# Patient Record
Sex: FEMALE | URBAN - METROPOLITAN AREA
[De-identification: names, ages, dates, MRNs, and addresses within clinical notes are randomized per-mention and may not be internally consistent; named-entity substitution may affect disease eponyms.]

---

## 2018-11-02 ENCOUNTER — OFFICE VISIT (OUTPATIENT)
Dept: URBAN - METROPOLITAN AREA CLINIC 11 | Facility: CLINIC | Age: 58
End: 2018-11-02
Payer: COMMERCIAL

## 2018-11-02 DIAGNOSIS — H43.813 VITREOUS DEGENERATION, BILATERAL: ICD-10-CM

## 2018-11-02 PROCEDURE — 92014 COMPRE OPH EXAM EST PT 1/>: CPT | Performed by: OPTOMETRIST

## 2018-11-02 ASSESSMENT — INTRAOCULAR PRESSURE
OS: 15
OD: 15

## 2018-11-02 NOTE — IMPRESSION/PLAN
Impression: Other headache syndrome: G44.89. Plan: Ocular health appears normal ou. Does not appear to be vision related. May need a neuro referral. cont care with PCP. Monitor.

## 2018-11-02 NOTE — IMPRESSION/PLAN
Impression: Type 2 diabetes mellitus w/o complication: U94.3. Plan: No signs of retinopathy or neovascularization noted. Discussed ocular and systemic benefits of blood sugar control.  RTC 1yr complete exam

## 2020-01-30 ENCOUNTER — OFFICE VISIT (OUTPATIENT)
Dept: URBAN - METROPOLITAN AREA CLINIC 11 | Facility: CLINIC | Age: 60
End: 2020-01-30
Payer: COMMERCIAL

## 2020-01-30 PROCEDURE — 92014 COMPRE OPH EXAM EST PT 1/>: CPT | Performed by: OPTOMETRIST

## 2020-01-30 RX ORDER — CARBOXYMETHYLCELLULOSE SODIUM 5 MG/ML
0.5 % SOLUTION/ DROPS OPHTHALMIC
Qty: 1 | Refills: 3 | Status: ACTIVE
Start: 2020-01-30

## 2020-01-30 RX ORDER — CYCLOSPORINE 0.5 MG/ML
0.05 % EMULSION OPHTHALMIC
Qty: 1 | Refills: 11 | Status: INACTIVE
Start: 2020-01-30 | End: 2020-03-05

## 2020-01-30 ASSESSMENT — KERATOMETRY
OD: 45.00
OS: 45.13

## 2020-01-30 ASSESSMENT — INTRAOCULAR PRESSURE
OD: 14
OS: 14

## 2020-01-30 NOTE — IMPRESSION/PLAN
Impression: Type 2 diabetes mellitus w/o complication: Q19.2. Plan: No signs of retinopathy or neovascularization noted. Discussed ocular and systemic benefits of blood sugar control.  RTC 1yr complete exam

## 2020-01-30 NOTE — IMPRESSION/PLAN
Impression: Dry eye syndrome of bilateral lacrimal glands: H04.123.  Plan: RX Refresh  1 gtt qid  and Restasis 1 gtt BID OU

## 2020-02-27 ENCOUNTER — OFFICE VISIT (OUTPATIENT)
Dept: URBAN - METROPOLITAN AREA CLINIC 11 | Facility: CLINIC | Age: 60
End: 2020-02-27
Payer: COMMERCIAL

## 2020-02-27 PROCEDURE — 99213 OFFICE O/P EST LOW 20 MIN: CPT | Performed by: OPTOMETRIST

## 2020-02-27 ASSESSMENT — INTRAOCULAR PRESSURE
OD: 14
OS: 15

## 2020-02-27 NOTE — IMPRESSION/PLAN
Impression: Dry eye syndrome of bilateral lacrimal glands: H04.123. Plan: Pt still symptomatic with Refresh. Continue Refresh 1gtt qid and rx Restasis 1 gtt BID OU. Sample Lotemax bid ou.

## 2020-07-22 ENCOUNTER — OFFICE VISIT (OUTPATIENT)
Dept: URBAN - METROPOLITAN AREA CLINIC 11 | Facility: CLINIC | Age: 60
End: 2020-07-22
Payer: COMMERCIAL

## 2020-07-22 PROCEDURE — 99213 OFFICE O/P EST LOW 20 MIN: CPT | Performed by: OPTOMETRIST

## 2020-07-22 RX ORDER — LIFITEGRAST 50 MG/ML
5 % SOLUTION/ DROPS OPHTHALMIC
Qty: 60 | Refills: 11 | Status: INACTIVE
Start: 2020-07-22 | End: 2021-04-14

## 2020-07-22 NOTE — IMPRESSION/PLAN
Impression: Dry eye syndrome of bilateral lacrimal glands: H04.123. Plan: D/C Restasis. Start Rx Xiidra BID OU. Continue Refresh 1gtt qid.  f/u 1 month

## 2020-09-09 ENCOUNTER — OFFICE VISIT (OUTPATIENT)
Dept: URBAN - METROPOLITAN AREA CLINIC 11 | Facility: CLINIC | Age: 60
End: 2020-09-09
Payer: COMMERCIAL

## 2020-09-09 PROCEDURE — 99213 OFFICE O/P EST LOW 20 MIN: CPT | Performed by: OPTOMETRIST

## 2020-09-09 ASSESSMENT — INTRAOCULAR PRESSURE
OD: 16
OS: 16

## 2020-09-09 NOTE — IMPRESSION/PLAN
Impression: Dry eye syndrome of bilateral lacrimal glands: H04.123. Plan: Cont Xiidra BID OU. Continue Refresh 1gtt qid.  f/u 6 month DE for DM

## 2021-03-24 ENCOUNTER — OFFICE VISIT (OUTPATIENT)
Dept: URBAN - METROPOLITAN AREA CLINIC 11 | Facility: CLINIC | Age: 61
End: 2021-03-24
Payer: COMMERCIAL

## 2021-03-24 DIAGNOSIS — G44.89 OTHER HEADACHE SYNDROME: ICD-10-CM

## 2021-03-24 DIAGNOSIS — H04.123 DRY EYE SYNDROME OF BILATERAL LACRIMAL GLANDS: ICD-10-CM

## 2021-03-24 DIAGNOSIS — E11.9 TYPE 2 DIABETES MELLITUS W/O COMPLICATION: Primary | ICD-10-CM

## 2021-03-24 PROCEDURE — 92014 COMPRE OPH EXAM EST PT 1/>: CPT | Performed by: OPTOMETRIST

## 2021-03-24 RX ORDER — FLUOROMETHOLONE 1 MG/ML
0.1 % SOLUTION/ DROPS OPHTHALMIC
Qty: 7.5 | Refills: 1 | Status: INACTIVE
Start: 2021-03-24 | End: 2021-04-14

## 2021-03-24 ASSESSMENT — INTRAOCULAR PRESSURE
OS: 15
OD: 14

## 2021-03-24 NOTE — IMPRESSION/PLAN
Impression: Other headache syndrome: G44.89. Plan: OCT RNFL ordered and preformed today. RNFL normal OU. Ocular health appear normal. Headaches do not appear to be vision related. No optic nerve head edema. Continue care with neurology. Monitor.

## 2021-03-24 NOTE — IMPRESSION/PLAN
Impression: Type 2 diabetes mellitus w/o complication: M13.2. Plan: No signs of retinopathy or neovascularization noted. Discussed ocular and systemic benefits of blood sugar control.  RTC 1yr complete exam

## 2021-03-24 NOTE — IMPRESSION/PLAN
Impression: Dry eye syndrome of bilateral lacrimal glands: H04.123. Plan: Rx FML 1gtt TID OU. Cont Xiidra BID OU. Continue Refresh 1gtt qid. Consider Punctal plugs on the next visit.  f/u 1 month

## 2021-04-14 ENCOUNTER — OFFICE VISIT (OUTPATIENT)
Dept: URBAN - METROPOLITAN AREA CLINIC 11 | Facility: CLINIC | Age: 61
End: 2021-04-14
Payer: COMMERCIAL

## 2021-04-14 PROCEDURE — 99213 OFFICE O/P EST LOW 20 MIN: CPT | Performed by: OPTOMETRIST

## 2021-04-14 RX ORDER — FLUOROMETHOLONE 1 MG/ML
0.1 % SOLUTION/ DROPS OPHTHALMIC
Qty: 7.5 | Refills: 1 | Status: INACTIVE
Start: 2021-04-14 | End: 2021-10-06

## 2021-04-14 RX ORDER — LIFITEGRAST 50 MG/ML
5 % SOLUTION/ DROPS OPHTHALMIC
Qty: 60 | Refills: 11 | Status: INACTIVE
Start: 2021-04-14 | End: 2021-04-20

## 2021-04-14 ASSESSMENT — INTRAOCULAR PRESSURE
OD: 14
OS: 15

## 2021-04-14 NOTE — IMPRESSION/PLAN
Impression: Dry eye syndrome of bilateral lacrimal glands: H04.123. Condition: improving. Plan: Decrease FML 1gtt 1-2x/day OU. Cont Xiidra BID OU. (pt feels better with Xiidra) Continue Refresh 1gtt qid.  f/u 3 month

## 2021-08-30 ENCOUNTER — OFFICE VISIT (OUTPATIENT)
Dept: URBAN - METROPOLITAN AREA CLINIC 11 | Facility: CLINIC | Age: 61
End: 2021-08-30
Payer: COMMERCIAL

## 2021-08-30 PROCEDURE — 99213 OFFICE O/P EST LOW 20 MIN: CPT | Performed by: OPTOMETRIST

## 2021-08-30 ASSESSMENT — INTRAOCULAR PRESSURE
OD: 18
OS: 18

## 2021-08-30 NOTE — IMPRESSION/PLAN
Impression: Dry eye syndrome of bilateral lacrimal glands: H04.123. Condition: stable. Plan: Continue FML 1gtt 2x/day OU. Cont Xiidra BID OU. Continue Refresh 1gtt qid.  f/u 6 month CFM for DM

## 2022-03-01 ENCOUNTER — OFFICE VISIT (OUTPATIENT)
Dept: URBAN - METROPOLITAN AREA CLINIC 11 | Facility: CLINIC | Age: 62
End: 2022-03-01
Payer: COMMERCIAL

## 2022-03-01 PROCEDURE — 92014 COMPRE OPH EXAM EST PT 1/>: CPT | Performed by: OPTOMETRIST

## 2022-03-01 ASSESSMENT — INTRAOCULAR PRESSURE
OD: 18
OS: 18

## 2022-03-01 NOTE — IMPRESSION/PLAN
Impression: Type 2 diabetes mellitus w/o complication: X82.0. Plan: No signs of retinopathy or neovascularization noted. Discussed ocular and systemic benefits of blood sugar control. Continue blood sugar control.  RTC 1yr complete exam

## 2022-03-01 NOTE — IMPRESSION/PLAN
Impression: Dry eye syndrome of bilateral lacrimal glands: H04.123. Condition: stable. Plan: Cont Xiidra BID OU. Continue Refresh 1gtt qid.  FML PRN OU. f/u 6mns

## 2022-10-31 ENCOUNTER — OFFICE VISIT (OUTPATIENT)
Facility: LOCATION | Age: 62
End: 2022-10-31
Payer: COMMERCIAL

## 2022-10-31 DIAGNOSIS — H25.13 AGE-RELATED NUCLEAR CATARACT, BILATERAL: ICD-10-CM

## 2022-10-31 DIAGNOSIS — E11.9 TYPE 2 DIABETES MELLITUS WITHOUT COMPLICATIONS: Primary | ICD-10-CM

## 2022-10-31 DIAGNOSIS — H16.223 KERATOCONJUNCTIVITIS SICCA, BILATERAL: ICD-10-CM

## 2022-10-31 PROCEDURE — 92004 COMPRE OPH EXAM NEW PT 1/>: CPT | Performed by: OPTOMETRIST

## 2022-10-31 RX ORDER — LIFITEGRAST 50 MG/ML
5 % SOLUTION/ DROPS OPHTHALMIC
Qty: 180 | Refills: 3 | Status: ACTIVE
Start: 2022-10-31

## 2022-10-31 ASSESSMENT — VISUAL ACUITY
OD: 20/20
OS: 20/20

## 2022-10-31 ASSESSMENT — INTRAOCULAR PRESSURE
OD: 17
OS: 17

## 2022-10-31 NOTE — IMPRESSION/PLAN
Impression: Type 2 diabetes mellitus without complications: I03.7. Plan: Diabetes type II: no background retinopathy, no signs of neovascularization noted. Discussed ocular and systemic benefits of blood sugar control.

## 2022-10-31 NOTE — IMPRESSION/PLAN
Impression: Keratoconjunctivitis sicca, bilateral: G17.457. Plan: Dry eyes account for the patient's complaints. There is no evidence of permanent changes to the cornea. Explained condition does not have a cure and will need treatment for maintenance. Marked improvement noted today. It is recommended to continue Xiidra BID OU. Patient is encouraged to continue the use of AT BID- QID PRN.

## 2023-11-01 ENCOUNTER — OFFICE VISIT (OUTPATIENT)
Facility: LOCATION | Age: 63
End: 2023-11-01
Payer: COMMERCIAL

## 2023-11-01 DIAGNOSIS — H16.223 KERATOCONJUNCTIVITIS SICCA, BILATERAL: ICD-10-CM

## 2023-11-01 DIAGNOSIS — E11.9 TYPE 2 DIABETES MELLITUS WITHOUT COMPLICATIONS: Primary | ICD-10-CM

## 2023-11-01 DIAGNOSIS — H25.13 AGE-RELATED NUCLEAR CATARACT, BILATERAL: ICD-10-CM

## 2023-11-01 PROCEDURE — 92014 COMPRE OPH EXAM EST PT 1/>: CPT

## 2023-11-01 ASSESSMENT — VISUAL ACUITY
OS: 20/25
OD: 20/20

## 2023-11-01 ASSESSMENT — INTRAOCULAR PRESSURE
OS: 16
OD: 16